# Patient Record
Sex: FEMALE
[De-identification: names, ages, dates, MRNs, and addresses within clinical notes are randomized per-mention and may not be internally consistent; named-entity substitution may affect disease eponyms.]

---

## 2017-10-08 ENCOUNTER — HOSPITAL ENCOUNTER (EMERGENCY)
Dept: HOSPITAL 31 - C.ER | Age: 62
Discharge: HOME | End: 2017-10-08
Payer: MEDICAID

## 2017-10-08 VITALS — TEMPERATURE: 98.4 F | OXYGEN SATURATION: 97 %

## 2017-10-08 VITALS — RESPIRATION RATE: 18 BRPM | DIASTOLIC BLOOD PRESSURE: 79 MMHG | HEART RATE: 77 BPM | SYSTOLIC BLOOD PRESSURE: 135 MMHG

## 2017-10-08 DIAGNOSIS — J40: Primary | ICD-10-CM

## 2017-10-08 DIAGNOSIS — N39.0: ICD-10-CM

## 2017-10-08 LAB
ALBUMIN/GLOB SERPL: 1.1 {RATIO} (ref 1–2.1)
ALP SERPL-CCNC: 148 U/L (ref 38–126)
ALT SERPL-CCNC: 40 U/L (ref 9–52)
AST SERPL-CCNC: 24 U/L (ref 14–36)
BACTERIA #/AREA URNS HPF: (no result) /[HPF]
BASOPHILS # BLD AUTO: 0.1 K/UL (ref 0–0.2)
BASOPHILS NFR BLD: 0.6 % (ref 0–2)
BILIRUB SERPL-MCNC: 0.6 MG/DL (ref 0.2–1.3)
BILIRUB UR-MCNC: NEGATIVE MG/DL
BUN SERPL-MCNC: 20 MG/DL (ref 7–17)
CALCIUM SERPL-MCNC: 10.2 MG/DL (ref 8.6–10.4)
CHLORIDE SERPL-SCNC: 103 MMOL/L (ref 98–107)
CO2 SERPL-SCNC: 22 MMOL/L (ref 22–30)
EOSINOPHIL # BLD AUTO: 0.7 K/UL (ref 0–0.7)
EOSINOPHIL NFR BLD: 6.3 % (ref 0–4)
ERYTHROCYTE [DISTWIDTH] IN BLOOD BY AUTOMATED COUNT: 13.8 % (ref 11.5–14.5)
GLOBULIN SER-MCNC: 4 GM/DL (ref 2.2–3.9)
GLUCOSE SERPL-MCNC: 100 MG/DL (ref 65–105)
GLUCOSE UR STRIP-MCNC: NORMAL MG/DL
HCT VFR BLD CALC: 42.8 % (ref 34–47)
KETONES UR STRIP-MCNC: NEGATIVE MG/DL
LEUKOCYTE ESTERASE UR-ACNC: (no result) LEU/UL
LYMPHOCYTES # BLD AUTO: 2.2 K/UL (ref 1–4.3)
LYMPHOCYTES NFR BLD AUTO: 19.7 % (ref 20–40)
MCH RBC QN AUTO: 29.3 PG (ref 27–31)
MCHC RBC AUTO-ENTMCNC: 32.7 G/DL (ref 33–37)
MCV RBC AUTO: 89.6 FL (ref 81–99)
MONOCYTES # BLD: 0.7 K/UL (ref 0–0.8)
MONOCYTES NFR BLD: 5.9 % (ref 0–10)
NRBC BLD AUTO-RTO: 0.1 % (ref 0–2)
PH UR STRIP: 5 [PH] (ref 5–8)
PLATELET # BLD: 281 K/UL (ref 130–400)
PMV BLD AUTO: 11.1 FL (ref 7.2–11.7)
POTASSIUM SERPL-SCNC: 3.7 MMOL/L (ref 3.6–5.2)
PROT SERPL-MCNC: 8.5 G/DL (ref 6.3–8.3)
PROT UR STRIP-MCNC: NEGATIVE MG/DL
RBC # UR STRIP: NEGATIVE /UL
RBC #/AREA URNS HPF: 1 /HPF (ref 0–3)
SODIUM SERPL-SCNC: 137 MMOL/L (ref 132–148)
SP GR UR STRIP: 1.02 (ref 1–1.03)
UROBILINOGEN UR-MCNC: NORMAL MG/DL (ref 0.2–1)
WBC # BLD AUTO: 11.3 K/UL (ref 4.8–10.8)
WBC #/AREA URNS HPF: 141 /HPF (ref 0–5)

## 2017-10-08 PROCEDURE — 96374 THER/PROPH/DIAG INJ IV PUSH: CPT

## 2017-10-08 PROCEDURE — 83880 ASSAY OF NATRIURETIC PEPTIDE: CPT

## 2017-10-08 PROCEDURE — 87804 INFLUENZA ASSAY W/OPTIC: CPT

## 2017-10-08 PROCEDURE — 96361 HYDRATE IV INFUSION ADD-ON: CPT

## 2017-10-08 PROCEDURE — 93005 ELECTROCARDIOGRAM TRACING: CPT

## 2017-10-08 PROCEDURE — 80053 COMPREHEN METABOLIC PANEL: CPT

## 2017-10-08 PROCEDURE — 81001 URINALYSIS AUTO W/SCOPE: CPT

## 2017-10-08 PROCEDURE — 84484 ASSAY OF TROPONIN QUANT: CPT

## 2017-10-08 PROCEDURE — 71020: CPT

## 2017-10-08 PROCEDURE — 99285 EMERGENCY DEPT VISIT HI MDM: CPT

## 2017-10-08 PROCEDURE — 85025 COMPLETE CBC W/AUTO DIFF WBC: CPT

## 2017-10-08 NOTE — C.PDOC
History Of Present Illness





61 y/o female with no Hx of COPD, c/o dry, non-productive cough and pleuritic 

pain with cough for 3 days. Patient is taking OTC meds intermittently. Denies 

fever or chills. No palpitations, or diaphoresis.





Time Seen by Provider: 10/08/17 19:49


Chief Complaint (Nursing): Shortness Of Breath


History Per: Patient


History/Exam Limitations: no limitations


Onset/Duration Of Symptoms: Days (3)


Current Symptoms Are (Timing): Still Present


Severity: Mild


Recent travel outside of the [place]: No


Additional History Per: Patient





Past Medical History


Reviewed: Historical Data, Nursing Documentation, Vital Signs


Vital Signs: 


 Last Vital Signs











Temp  98.4 F   10/08/17 18:24


 


Pulse  77   10/08/17 20:43


 


Resp  18   10/08/17 20:43


 


BP  135/79   10/08/17 20:43


 


Pulse Ox  97   10/08/17 20:48














- Medical History


PMH: Arthritis, Asthma, Bronchitis, Diabetes, HTN, Hypercholesterolemia


Family History: States: Unknown Family Hx





- Social History


Hx Tobacco Use: Yes


Hx Alcohol Use: Yes


Hx Substance Use: No





- Immunization History


Hx Tetanus Toxoid Vaccination: No


Hx Influenza Vaccination: No


Hx Pneumococcal Vaccination: No





Review Of Systems


Except As Marked, All Systems Reviewed And Found Negative.


Constitutional: Negative for: Fever, Chills, Sweats


Cardiovascular: Positive for: Chest Pain (Pleuritic pain with cough).  Negative 

for: Palpitations


Respiratory: Positive for: Cough





Physical Exam





- Physical Exam


Appears: Non-toxic, No Acute Distress


Skin: Warm, Dry


Head: Atraumatic, Normacephalic


Chest: Symmetrical


Cardiovascular: Rhythm Regular


Respiratory: No Rales, Rhonchi (Mild, scattered), Wheezing, Other (Cough. No 

egophony)


Neurological/Psych: Oriented x3





ED Course And Treatment





- Laboratory Results


Result Diagrams: 


 10/08/17 20:06





 10/08/17 20:06


Lab Interpretation: Abnormal ( WBC's, mild leukocytosis without L shift)


ECG: Interpreted By Me


ECG Rhythm: Sinus Rhythm


O2 Sat by Pulse Oximetry: 97 (RA)


Pulse Ox Interpretation: Normal





- Radiology


CXR: Interpreted by Me


CXR Interpretation: Yes: No Acute Disease


Reevaluation Time: 20:45


Reassessment Condition: Improved





Medical Decision Making


Medical Decision Making: 





mild/mod UTI- Bactrim


NO L-shift mild leukocytosis, prob related to steroids given in ED


viral syndrome, no s/s of COPD, no nebs treatments x 4 yrs.


MDI and OTC flu/cold meds, flu neg.








Disposition


Doctor Will See Patient In The: Office


Counseled Patient/Family Regarding: Studies Performed, Diagnosis





- Disposition


Referrals: 


Juan Alvarez MD [Medical Doctor] - 


Disposition: HOME/ ROUTINE


Disposition Time: 20:47


Condition: GOOD


Additional Instructions: 


UTI: Sigue Bactrim DS (antibiotico) 2 veces al jose martin para cumplir 5 gore





Bronchitis:


El Bactrim te va ayudar con esto tambien


Sigue Dayquil/Nyquil para gardiner tos








Sigue con gardiner medico en 3 gore para re-evaluacion manju necessario. 


Prescriptions: 


Sulfamethoxazole/Trimethoprim [Bactrim  mg-160 mg] 1 tab PO BID #9 tab


Instructions:  Urinary Tract Infection in Women (ED), Acute Bronchitis (ED), 

Viral Syndrome (ED)


Forms:  [facility] (English)


Print Language: [place]





- Clinical Impression


Clinical Impression: 


 Bronchitis, UTI (urinary tract infection)








- Scribe Statement


The provider has reviewed the documentation as recorded by the Scribe





Braden ba





All medical record entries made by the Scribe were at my direction and 

personally dictated by me. I have reviewed the chart and agree that the record 

accurately reflects my personal performance of the history, physical exam, 

medical decision making, and the department course for this patient. I have 

also personally directed, reviewed, and agree with the discharge instructions 

and disposition.

## 2017-10-09 NOTE — CARD
--------------- APPROVED REPORT --------------





EKG Measurement

Heart [id number]

[id number]

[id number]

[id number]

[id number]



<Conclusion>

Normal sinus rhythm

Normal ECG

## 2017-10-09 NOTE — RAD
HISTORY:

SOB  



COMPARISON:

Comparison is made to 07/12/2015



TECHNIQUE:

Chest PA and lateral



FINDINGS:



LUNGS:

No evidence of new infiltrate or consolidation in the lungs.



PLEURA:

No significant pleural effusion identified. No pneumothorax apparent.



CARDIOVASCULAR:

Normal.



OSSEOUS STRUCTURES:

No significant abnormalities.



VISUALIZED UPPER ABDOMEN:

Normal.



OTHER FINDINGS:

None.



IMPRESSION:

No active disease.